# Patient Record
Sex: MALE | Race: WHITE | NOT HISPANIC OR LATINO | URBAN - METROPOLITAN AREA
[De-identification: names, ages, dates, MRNs, and addresses within clinical notes are randomized per-mention and may not be internally consistent; named-entity substitution may affect disease eponyms.]

---

## 2017-01-05 ENCOUNTER — COMMUNICATION - HEALTHEAST (OUTPATIENT)
Dept: ADMINISTRATIVE | Facility: CLINIC | Age: 42
End: 2017-01-05

## 2017-02-07 ENCOUNTER — OFFICE VISIT - HEALTHEAST (OUTPATIENT)
Dept: FAMILY MEDICINE | Facility: CLINIC | Age: 42
End: 2017-02-07

## 2017-02-07 DIAGNOSIS — F40.01 PANIC DISORDER WITH AGORAPHOBIA: ICD-10-CM

## 2017-02-07 DIAGNOSIS — F41.1 GAD (GENERALIZED ANXIETY DISORDER): ICD-10-CM

## 2017-02-07 DIAGNOSIS — F32.9 MAJOR DEPRESSION, CHRONIC: ICD-10-CM

## 2017-02-07 RX ORDER — SERTRALINE HYDROCHLORIDE 100 MG/1
200 TABLET, FILM COATED ORAL DAILY
Qty: 60 TABLET | Refills: 2 | Status: SHIPPED | OUTPATIENT
Start: 2017-02-07

## 2017-02-07 RX ORDER — HYDROXYZINE PAMOATE 100 MG
100 CAPSULE ORAL 3 TIMES DAILY PRN
Qty: 90 CAPSULE | Refills: 2 | Status: SHIPPED | OUTPATIENT
Start: 2017-02-07

## 2017-02-07 RX ORDER — CLONAZEPAM 0.5 MG/1
0.5 TABLET ORAL 3 TIMES DAILY PRN
Qty: 90 TABLET | Refills: 1 | Status: SHIPPED | OUTPATIENT
Start: 2017-02-07

## 2017-02-07 RX ORDER — QUETIAPINE FUMARATE 100 MG/1
TABLET, FILM COATED ORAL
Qty: 30 TABLET | Refills: 2 | Status: SHIPPED | OUTPATIENT
Start: 2017-02-07

## 2017-02-07 ASSESSMENT — MIFFLIN-ST. JEOR: SCORE: 2115.94

## 2017-03-06 ENCOUNTER — COMMUNICATION - HEALTHEAST (OUTPATIENT)
Dept: FAMILY MEDICINE | Facility: CLINIC | Age: 42
End: 2017-03-06

## 2017-04-02 ENCOUNTER — COMMUNICATION - HEALTHEAST (OUTPATIENT)
Dept: FAMILY MEDICINE | Facility: CLINIC | Age: 42
End: 2017-04-02

## 2021-05-30 VITALS — WEIGHT: 261.7 LBS | HEIGHT: 73 IN | BODY MASS INDEX: 34.69 KG/M2

## 2021-06-08 NOTE — PROGRESS NOTES
PSYCHIATRY CLINIC VISIT NOTE        DIAGNOSIS:   Principal Problem:    1. PORTIA (generalized anxiety disorder)    2. Major depression, chronic    3. Panic disorder with agoraphobia       PLAN:   1. Ongoing education given regarding diagnostic and treatment options with adequate verbalization of understanding.  2. Continue Zoloft 200 mg daily, Seroquel 100 mg daily and Vistaril 100 mg 3 times a day.  I did refill the Klonopin at 0.5 mg 3 times a day however continue to encourage patient to try take a half a tablet at least with one dose a day.  We again discussed the long-term use of benzodiazepines and the risks.  Will increase the Rexulti to 3 mg daily.  3. Crisis planning in place.  4. Continue/refer to Primary Care Provider.  5. Continue working with therapist weekly, weekly volunteering and weekly meetings with the [facility] worker.  6. Psychotherapy provided in-session.  7. Medication side effects/warning signs reviewed.  8. Return in about 2 months (around 4/7/2017) for Recheck.    Risk Assessment: Patient able to contract for safety           DATE OF SERVICE:   2/7/2017         REASON FOR VISIT:     Chief Complaint   Patient presents with     Mental Health Problem     follow up            HPI:    This is a 41 y.o. male with history of general anxiety disorder, panic disorder with agoraphobia and depression. Patient have a history of these symptoms since his early 20s but has had significant debilitating symptoms in the last several year. We have tried multiple medications that have been unsuccessful in treating his symptoms. [employer] testing completed-see scanned documents.    Last visit 12/9/16.  Recommendation at last visit: Continue Zoloft 200 mg, Seroquel 100 mg, Vistaril 100 mg TID, Rexulti 2 mg daily.     Today, patient reports he is continuing to struggle with his mood symptoms.  Complaint of ongoing moderate anxiety and depression.  Again finds that the anxiety to be the primary symptom and it's  overwhelming effects cause the depression.  Denies any suicidal ideations.  No side effects with the medication.  No EPS/TD symptoms.  He is sleeping 10-12 hr per night.      Since our last visit patient did not look into any part-time job.  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  States he does work for about 3 hours.  Has attended this for 3 separate sessions.  With this he has noted increased amount of anxiety.  Each day seem to be getting a bit better.  He also continues to see his therapist weekly through [facility] and attend [facility] support group every other week.  Meets with his [employer] worker in public settings once weekly.      SHx:   Born and Raised: Raised in an intact family has 1 younger sister. Denies any childhood trauma or abuse. No learning disabilities graduated from high school on time. Declined to college but dropped out due to severe anxiety and panic attacks.  Occupation: Unemployed several years due to mental health symptoms.  Has applied for SSDI   Marital Status: Single  Children: one daughter-age 10  Living Situation: Living arrangements - the patient lives alone.         MEDICATIONS:     Current Outpatient Prescriptions   Medication Sig Dispense Refill     brexpiprazole (REXULTI) 2 mg Tab tablet Take 1 tablet (2 mg total) by mouth daily. 30 tablet 2     cholecalciferol, vitamin D3, (VITAMIN D3) 2,000 unit Tab Take 3,000 Units by mouth daily.        clonazePAM (KLONOPIN) 0.5 MG tablet Take 1 tablet (0.5 mg total) by mouth 3 (three) times a day as needed for anxiety. 90 tablet 1     fluticasone (FLONASE) 50 mcg/actuation nasal spray 2 sprays into each nostril daily. 48 g 2     hydrOXYzine (VISTARIL) 100 MG capsule Take 1 capsule (100 mg total) by mouth 3 (three) times a day as needed. 90 capsule 1     lisinopril (PRINIVIL,ZESTRIL) 20 MG tablet Take 1 tablet (20 mg total) by mouth daily. 90 tablet 1     metoprolol succinate (TOPROL-XL) 100 MG 24 hr tablet Take 1 tablet (100 mg  "total) by mouth daily with supper. 90 tablet 0     montelukast (SINGULAIR) 10 mg tablet Take 10 mg by mouth daily.       QUEtiapine (SEROQUEL) 100 MG tablet TAKE 1 TABLET(100 MG) BY MOUTH AT BEDTIME 90 tablet 0     sertraline (ZOLOFT) 100 MG tablet Take 2 tablets (200 mg total) by mouth daily. 60 tablet 1     VENTOLIN HFA 90 mcg/actuation inhaler Inhale 1 puff as needed.       No current facility-administered medications for this visit.        Medication adherence: Reviewed risk/benefits of medication , Patient able to verbalize understanding of side effects  and Patient verbally consents to taking medications  Medication side effects: none  Benefit: partial     website reviewed re:controlled substance use       ROS:   All systems negative except as mentioned above in HPI          MENTAL STATUS EXAM:   Vitals:   Visit Vitals     /80 (Patient Site: Right Arm, Patient Position: Sitting, Cuff Size: Adult Large)     Pulse 97     Ht 6' 1\" (1.854 m)     Wt (!) 261 lb 11.2 oz (118.7 kg)     SpO2 98%     BMI 34.53 kg/m2       Appearance:  Clean/neat  Mood:  anxious and depressed  Affect: mood congruent  Suicidal Ideation: absent  Homicidal Ideation: absent  Thought process: normal  Thought content: Normal  Fund of Knowledge: Sufficient  Attention/Concentration: intact  Language ability: intact  Memory: recent and remote memory intact  Insight and Judgement: fair  Orientation: person, place, time and situation  Psychomotor Behavior: normal  Muscle Strength and Tone: normal  Gait and Station: normal gait and station         PHYSICAL EXAM:   General appearance - alert, well appearing, and in no distress  Mental status - alert, oriented to person, place, and time  Neurological - alert, oriented, normal speech, no focal findings or movement disorder noted         LABS:   Personally reviewed.     Total time  25 minutes with > 50% spent on coordination of cares and psycho-education.      Mandy Borjas, ANEUDY  "